# Patient Record
Sex: MALE | Race: WHITE | Employment: UNEMPLOYED | URBAN - METROPOLITAN AREA
[De-identification: names, ages, dates, MRNs, and addresses within clinical notes are randomized per-mention and may not be internally consistent; named-entity substitution may affect disease eponyms.]

---

## 2020-10-22 ENCOUNTER — OFFICE VISIT (OUTPATIENT)
Dept: URGENT CARE | Facility: URGENT CARE | Age: 25
End: 2020-10-22
Payer: COMMERCIAL

## 2020-10-22 VITALS
WEIGHT: 158 LBS | HEART RATE: 86 BPM | DIASTOLIC BLOOD PRESSURE: 71 MMHG | RESPIRATION RATE: 16 BRPM | OXYGEN SATURATION: 99 % | SYSTOLIC BLOOD PRESSURE: 122 MMHG

## 2020-10-22 DIAGNOSIS — H66.001 NON-RECURRENT ACUTE SUPPURATIVE OTITIS MEDIA OF RIGHT EAR WITHOUT SPONTANEOUS RUPTURE OF TYMPANIC MEMBRANE: ICD-10-CM

## 2020-10-22 DIAGNOSIS — J02.9 SORE THROAT: Primary | ICD-10-CM

## 2020-10-22 LAB
DEPRECATED S PYO AG THROAT QL EIA: NEGATIVE
SPECIMEN SOURCE: NORMAL
SPECIMEN SOURCE: NORMAL
STREP GROUP A PCR: NOT DETECTED

## 2020-10-22 PROCEDURE — 99204 OFFICE O/P NEW MOD 45 MIN: CPT | Performed by: NURSE PRACTITIONER

## 2020-10-22 PROCEDURE — 99N1174 PR STATISTIC STREP A RAPID: Performed by: NURSE PRACTITIONER

## 2020-10-22 PROCEDURE — 87651 STREP A DNA AMP PROBE: CPT | Performed by: NURSE PRACTITIONER

## 2020-10-22 RX ORDER — AMOXICILLIN 500 MG/1
1000 CAPSULE ORAL 2 TIMES DAILY
Qty: 40 CAPSULE | Refills: 0 | Status: SHIPPED | OUTPATIENT
Start: 2020-10-22 | End: 2020-11-01

## 2020-10-22 RX ORDER — LIDOCAINE HYDROCHLORIDE 20 MG/ML
15 SOLUTION OROPHARYNGEAL
Qty: 200 ML | Refills: 0 | Status: SHIPPED | OUTPATIENT
Start: 2020-10-22 | End: 2020-10-29

## 2020-10-22 NOTE — PATIENT INSTRUCTIONS
Patient Education     Otitis Media (Infección del Oído)   Qué es la otitis media?     Click Image to Enlarge   La otitis media es diego infección o inflamación localizada en el oído medio.  Alrededor del 75 por ciento de los niños covington padecido al menos un episodio de otitis media para la edad de shakeel años. Otitis media puede también afecta a los adultos, aunque es principalmente diego enfermedad que se presenta con mayor frecuencia en los niños.    Qué causa la otitis media?  La inflamación normalmente empieza cuando las infecciones debidas a inflamación de la garganta, resfriados u otros problemas respiratorios, se extienden al oído medio.   Cuáles son los síntomas de la otitis media?  A continuación, se enumeran los síntomas más comunes de la otitis media. Sin embargo, cada individuo puede experimentar los síntomas de diego forma diferente.  Los síntomas comunes de otitis media en los niños incluyen los siguientes:    Irritabilidad inusual.    Dificultad para dormir o permanecer dormido.    Tirón de diego o ambas orejas.    Fiebre, especialmente en los bebes y los niños pequeños.    Drenaje de líquido del oído, u oídos.    Pérdida del equilibrio.    Dificultades con la audición.  Los síntomas de la otitis media pueden parecerse a los de otras condiciones o problemas médicos. Consulte a un médico para feliciano diagnóstico.   Cuáles son los efectos de la otitis media?  Además de los síntomas enumerados anteriormente para la otitis media, la otitis media no tratada puede producir cualquiera o todos los problemas siguientes:    Infección en otras partes de la shauna.    Pérdida permanente de la audición.    Problemas con el habla y el desarrollo del lenguaje.   Puede prevenirse la otitis media?  Los medicamentos para el resfriado y la alergia no parecen prevenir la otitis media. Actualmente, no existe diego vacuna que pueda prevenir la enfermedad. Sin embargo, existen ciertos factores que parecen elevar las probabilidades de que  algunos niños puedan desarrollar la otitis media, entre los que se incluyen los siguientes:    Vivir en diego casa donde se fumen cigarrillos.    Brown el biberón mientras está acostado.   Cómo se diagnostica la otitis media?  Además de un examen físico e historia médica completa, el médico examinará el oído externo y el tímpano, en gilberto o ambos oídos, utilizando un otoscopio. El otoscopio es un instrumento que lleva diego anyi que permite al médico darin dentro del oído. Un otoscopio neumático echa un soplo de aire en el oído para analizar el movimiento del tímpano.  Asimismo, puede realizarse diego timpanometría, un examen que permite dirigir el aire y el grey al oído medio.  Un examen de la audición puede realizarse a las personas que tienen infecciones del oído frecuentes.  Tratamiento para la otitis media  El tratamiento específico será determinado por el médico, o médicos basándose en lo siguiente:    La edad del paciente, feliciano estado general de loi y feliciano historia médica.    Que coughlin avanzada está la enfermedad.    Hillary expectativas para la trayectoria de la enfermedad.    La tolerancia del paciente a determinados medicamentos, procedimientos o terapias.    La opinión o preferencia del paciente (o de feliciano taylor).  El tratamiento puede incluir lo siguiente:    Medicamentos antibióticos.    Medicamentos para el dolor.    Cirugía en la que se introducen tubos con el fin de tratar infecciones crónicas del tímpano.    [id number] [employer]. [address] [place] [id number]. Todos los derechos reservados. Esta información no pretende sustituir la atención médica profesional. Sólo feliciano médico puede diagnosticar y tratar un problema de loi.           Patient Education     Cuando usted tiene dolor de garganta    El dolor de garganta puede ser muy molesto y puede tener muchas causas diferentes. Feliciano proveedor de atención médica colaborará con usted para determinar la causa de feliciano dolor de garganta y encontrar  el tratamiento más adecuado para feliciano adrian.   Cuáles son las causas del dolor de garganta?  Las siguientes cosas pueden causar o empeorar el dolor de garganta:    Los virus del catarro y de la gripe    Las bacterias    Los irritantes ivy el humo de tabaco o la polución en el aire    El reflujo ácido  Twyla garganta ailyn  Las amígdalas se encuentran a ambos lados de la garganta, cerca de la base de la lengua. En ellas se acumulan los virus y las bacterias, y ayudan a combatir las infecciones. La garganta (faringe) es la vía por la que pasa el aire. La mucosidad de la cavidad nasal también baja por maylin conducto.  Twyla garganta inflamada  Las amígdalas y la faringe pueden inflamarse a consecuencia de los virus del catarro y de la gripe. El goteo postnasal (exceso de mucosidad procedente de la cavidad nasal) puede irritar la garganta y hacer que esta o las amígdalas se vuelvan más propensas a las infecciones por bacterias. La tonsilitis severa en los niños y en los adultos, si se meggan sin tratar, puede causar la formación de bolsas de pus (abscesos) cerca de las amígdalas.  Feliciano evaluación  Un examen médico puede ser útil para determinar la causa de feliciano dolor de garganta y también puede ayudar a feliciano proveedor de atención médica a elegir el tratamiento más adecuado para usted. La evaluación podría incluir twyla historia clínica, un examen médico y pruebas de diagnóstico.  Historia clínica  Feliciano proveedor de atención médica podría preguntarle lo siguiente:     Cuánto tiempo hace que tiene dolor de garganta y qué tratamiento le covington dado hasta ahora?     Tiene algún otro síntoma, ivy yissel en el cuerpo, fiebre o tos?     Es recurrente (aparece periódicamente) feliciano dolor de garganta? En neftali adrian  con qué frecuencia lo tiene?  Cuántos días de escuela o trabajo paulson perdido a consecuencia del dolor de garganta?     Tiene dificultad para comer o para tragar?     Le covington dicho alguna vez que ronca, o tiene usted otros trastornos del  sueño?     Tiene mal aliento?    Al toser,  expulsa mucosidad con sabor desagradable?  Examen médico  Sabino maylin examen, feliciano proveedor de atención médica le examinará los oídos, la nariz y la garganta para determinar si hay algún problema. También revisará si tiene hinchazón en el rafiq y le auscultará el pecho.  Pruebas posibles  Feliciano proveedor de atención médica también podría hacerle las siguientes pruebas:    Diego muestra o exudado faríngeo para revisar si tiene bacterias tales ivy los estreptococos (bacterias que causan amigdalitis estreptocócica)    Un análisis de rohit para revisar si tiene mononucleosis (diego infección por virus)    Diego radiografía para revisar si tiene neumonía, especialmente si tiene tos  Tratamiento del dolor de garganta  El tratamiento depende de muchos factores.  Cuál es la causa más probable?  Se trata de un problema reciente o es algo que desaparece y aparece de nuevo? En muchos casos, lo mejor es tratar los síntomas, descansar y dejar que el problema se resuelva por sí mismo. Los antibióticos pueden ayudar a eliminar algunas infecciones bacterianas. Para los casos más graves de amigdalitis recurrente, es posible que deban sacarle las amígdalas.  Cómo aliviar los síntomas    No fume y evite el humo de segunda mano.    Para los niños, deles un spray para la garganta o helados de jugo (paletas heladas). Los adultos y los niños mayores pueden usar pastillas para la garganta.    Dasia líquidos calientes para aliviar la garganta y ayudar a diluir la mucosidad. Evite las bebidas alcohólicas, las comidas picantes y las bebidas ácidas ivy el jugo de naranja, ya que pueden irritar más la garganta.    [place] gárgaras con agua tibia salada (1 cucharadita de sal disuelta en 8 onzas de agua tibia).    Use un humidificador para mantener la humedad en el aire y aliviar el resecamiento de la garganta.    [place] medicamentos contra el dolor disponibles sin receta, ivy el acetaminofén y el ibuprofeno. Use  estos medicamentos siguiendo las indicaciones y sin exceder la dosis recomendada. No le dé aspirina a los niños.    Se necesitan antibióticos?  Si la causa de feliciano dolor de garganta es diego infección bacteriana, los antibióticos pueden ayudarle a sanar más rápidamente y prevenir las complicaciones. Aunque la infección de garganta por estreptecocos es diego nfección importante que es tratable, esta constituye tan solo entre el 5 al 15% de los yissel de garganta en los adultos que buscan atención médica. La mayoría de los casos de dolor de garganta son causados por los virus del resfriado o de la gripe, y los antibióticos no son útiles para las infecciones virales. De hecho, el uso de antibióticos cuando no son necesarios puede producir bacterias que son más difíciles de destruir. Feliciano proveedor de atención médica le recetará antibióticos solamente si considera que estos medicamentos podrían ayudarlo.  Si le recetan antibióticos  [place] los medicamentos exactamente según las indicaciones. Asegúrese de terminar toda la cantidad que le hayan indicado, aun cuando se sienta mejor. Asimismo, consulte con feliciano proveedor de atención médica o con feliciano farmacéutico para averiguar cuáles son los efectos secundarios comunes y lo que debe hacer si se le presentan.   Necesita cirugía?  En algunos casos puede ser necesario extraer las amígdalas. [place] suele hacerse ivy paciente ambulatorio o externo (el paciente regresa a casa el mismo día de la operación). Feliciano proveedor de atención médica podría aconsejarle la extracción de las amígdalas en los siguientes casos:    Ha tenido varios episodios graves de amigdalitis en el transcurso de un año. Se consideran episodios  graves  aquellos que le hacen perder días de escuela o de trabajo, o que deben tratarse con antibióticos.    Amigdalitis que causa problemas respiratorios cori el sueño.    Amigdalitis causada por partículas de comida que se acumulan en las bolsas o criptas de las amígdalas  (amigdalitis críptica).  Llame a feliciano proveedor de atención médica en cualquiera de los siguientes casos:    Le empeoran los síntomas o le aparecen síntomas nuevos.    Inflamación de las amígdalas que dificulta la respiración.    El dolor es tan andrew que le impide beber líquidos.    Tiene diego erupción en la piel, urticaria o sibilancias al respirar; cualquiera de estas señales podría indicar diego reacción alérgica a los antibióticos.    Hillary síntomas no mejoran en el transcurso de diego semana.    Hillary síntomas no mejoran en el transcurso de 2 o 3 días desde que empieza a damaso antibióticos.   Date Last Reviewed: 10/1/2016    [id number] [employer]. [address] [place] [address], [place] [address]. Todos los derechos reservados. Esta información no pretende sustituir la atención médica profesional. Sólo feliciano médico puede diagnosticar y tratar un problema de loi.

## 2020-10-22 NOTE — PROGRESS NOTES
SUBJECTIVE:   Chaim Hallman is a 25 year old male presenting with a chief complaint of ST, pain with swallowing and lump on the right side of neck.    Onset of symptoms was 1 day(s) ago.  Course of illness is same.    Severity moderate  Previous Treatment Ibuprofen      History reviewed. No pertinent past medical history.  Current Outpatient Medications   Medication Sig Dispense Refill     amoxicillin (AMOXIL) 500 MG capsule Take 2 capsules (1,000 mg) by mouth 2 times daily for 10 days 40 capsule 0     lidocaine (XYLOCAINE) 2 % solution Swish and spit 15 mLs in mouth every 3 hours as needed for moderate pain ; Max 8 doses/24 hour period. 200 mL 0     Social History     Tobacco Use     Smoking status: Current Every Day Smoker     Packs/day: 0.25     Years: 5.00     Pack years: 1.25     Types: Cigarettes     Smokeless tobacco: Never Used   Substance Use Topics     Alcohol use: Not on file     History reviewed. No pertinent family history.     ROS: 10 point ROS neg other than the symptoms noted above in the HPI.     OBJECTIVE  /71 (BP Location: Left arm, Patient Position: Chair, Cuff Size: Adult Large)   Pulse 86   Resp 16   Wt 71.7 kg (158 lb)   SpO2 99%       GENERAL APPEARANCE: healthy appearing, alert     EYES: PERRL, EOMI, sclera non-icteric     HENT: oral exam benign, mucus membranes intact, without ulcers or lesions     HENT: nose and mouth without ulcers or lesions, TM congested/bulging right, tonsillar hypertrophy, tonsillar erythema and tonsillar exudate     NECK: no adenopathy or asymmetry, thyroid normal to palpation     RESP: lungs clear to auscultation - no rales, rhonchi or wheezes     CV: regular rates and rhythm, no murmurs, rubs, or gallop     SKIN: no suspicious lesions or rashes      Labs:  Results for orders placed or performed in visit on 10/22/20 (from the past 24 hour(s))   Streptococcus A Rapid Scr w Reflx to PCR    Specimen: Throat   Result Value Ref Range    Strep Specimen Description  Throat     Streptococcus Group A Rapid Screen Negative NEG^Negative         ASSESSMENT/PLAN:      ICD-10-CM    1. Sore throat  J02.9 Streptococcus A Rapid Scr w Reflx to PCR     lidocaine (XYLOCAINE) 2 % solution     Group A Streptococcus PCR Throat Swab   2. Non-recurrent acute suppurative otitis media of right ear without spontaneous rupture of tympanic membrane  H66.001 amoxicillin (AMOXIL) 500 MG capsule        Follow Up:      Patient Instructions       Patient Education     Otitis Media (Infección del Oído)   Qué es la otitis media?     Click Image to Enlarge   La otitis media es diego infección o inflamación localizada en el oído medio.  Alrededor del 75 por ciento de los niños covington padecido al menos un episodio de otitis media para la edad de shakeel años. Otitis media puede también afecta a los adultos, aunque es principalmente diego enfermedad que se presenta con mayor frecuencia en los niños.    Qué causa la otitis media?  La inflamación normalmente empieza cuando las infecciones debidas a inflamación de la garganta, resfriados u otros problemas respiratorios, se extienden al oído medio.   Cuáles son los síntomas de la otitis media?  A continuación, se enumeran los síntomas más comunes de la otitis media. Sin embargo, cada individuo puede experimentar los síntomas de diego forma diferente.  Los síntomas comunes de otitis media en los niños incluyen los siguientes:    Irritabilidad inusual.    Dificultad para dormir o permanecer dormido.    Tirón de diego o ambas orejas.    Fiebre, especialmente en los bebes y los niños pequeños.    Drenaje de líquido del oído, u oídos.    Pérdida del equilibrio.    Dificultades con la audición.  Los síntomas de la otitis media pueden parecerse a los de otras condiciones o problemas médicos. Consulte a un médico para feliciano diagnóstico.   Cuáles son los efectos de la otitis media?  Además de los síntomas enumerados anteriormente para la otitis media, la otitis media no tratada puede  producir cualquiera o todos los problemas siguientes:    Infección en otras partes de la shauna.    Pérdida permanente de la audición.    Problemas con el habla y el desarrollo del lenguaje.   Puede prevenirse la otitis media?  Los medicamentos para el resfriado y la alergia no parecen prevenir la otitis media. Actualmente, no existe diego vacuna que pueda prevenir la enfermedad. Sin embargo, existen ciertos factores que parecen elevar las probabilidades de que algunos niños puedan desarrollar la otitis media, entre los que se incluyen los siguientes:    Vivir en diego casa donde se fumen cigarrillos.    Brown el biberón mientras está acostado.   Cómo se diagnostica la otitis media?  Además de un examen físico e historia médica completa, el médico examinará el oído externo y el tímpano, en gilberto o ambos oídos, utilizando un otoscopio. El otoscopio es un instrumento que lleva diego anyi que permite al médico darin dentro del oído. Un otoscopio neumático echa un soplo de aire en el oído para analizar el movimiento del tímpano.  Asimismo, puede realizarse diego timpanometría, un examen que permite dirigir el aire y el grey al oído medio.  Un examen de la audición puede realizarse a las personas que tienen infecciones del oído frecuentes.  Tratamiento para la otitis media  El tratamiento específico será determinado por el médico, o médicos basándose en lo siguiente:    La edad del paciente, feliciano estado general de loi y feliciano historia médica.    Que coughlin avanzada está la enfermedad.    Hillary expectativas para la trayectoria de la enfermedad.    La tolerancia del paciente a determinados medicamentos, procedimientos o terapias.    La opinión o preferencia del paciente (o de feliciano taylor).  El tratamiento puede incluir lo siguiente:    Medicamentos antibióticos.    Medicamentos para el dolor.    Cirugía en la que se introducen tubos con el fin de tratar infecciones crónicas del tímpano.    [id number] [employer]. [address] [place] [address]  [facility], [place] [address]. Todos los derechos reservados. Esta información no pretende sustituir la atención médica profesional. Sólo feliciano médico puede diagnosticar y tratar un problema de loi.           Patient Education     Cuando usted tiene dolor de garganta    El dolor de garganta puede ser muy molesto y puede tener muchas causas diferentes. Feliciano proveedor de atención médica colaborará con usted para determinar la causa de feliciano dolor de garganta y encontrar el tratamiento más adecuado para feliciano adrian.   Cuáles son las causas del dolor de garganta?  Las siguientes cosas pueden causar o empeorar el dolor de garganta:    Los virus del catarro y de la gripe    Las bacterias    Los irritantes ivy el humo de tabaco o la polución en el aire    El reflujo ácido  Twyla garganta ailyn  Las amígdalas se encuentran a ambos lados de la garganta, cerca de la base de la lengua. En ellas se acumulan los virus y las bacterias, y ayudan a combatir las infecciones. La garganta (faringe) es la vía por la que pasa el aire. La mucosidad de la cavidad nasal también baja por maylin conducto.  Twyla garganta inflamada  Las amígdalas y la faringe pueden inflamarse a consecuencia de los virus del catarro y de la gripe. El goteo postnasal (exceso de mucosidad procedente de la cavidad nasal) puede irritar la garganta y hacer que esta o las amígdalas se vuelvan más propensas a las infecciones por bacterias. La tonsilitis severa en los niños y en los adultos, si se meggan sin tratar, puede causar la formación de bolsas de pus (abscesos) cerca de las amígdalas.  Feliciano evaluación  Un examen médico puede ser útil para determinar la causa de feliciano dolor de garganta y también puede ayudar a feliciano proveedor de atención médica a elegir el tratamiento más adecuado para usted. La evaluación podría incluir twyla historia clínica, un examen médico y pruebas de diagnóstico.  Historia clínica  Feliciano proveedor de atención médica podría preguntarle lo siguiente:     Cuánto tiempo  hace que tiene dolor de garganta y qué tratamiento le covington dado hasta ahora?     Tiene algún otro síntoma, ivy yissel en el cuerpo, fiebre o tos?     Es recurrente (aparece periódicamente) feliciano dolor de garganta? En neftali adrian  con qué frecuencia lo tiene?  Cuántos días de escuela o trabajo paulson perdido a consecuencia del dolor de garganta?     Tiene dificultad para comer o para tragar?     Le covington dicho alguna vez que ronca, o tiene usted otros trastornos del sueño?     Tiene mal aliento?    Al toser,  expulsa mucosidad con sabor desagradable?  Examen médico  Sabino maylin examen, feliciano proveedor de atención médica le examinará los oídos, la nariz y la garganta para determinar si hay algún problema. También revisará si tiene hinchazón en el rafiq y le auscultará el pecho.  Pruebas posibles  Feliciano proveedor de atención médica también podría hacerle las siguientes pruebas:    Twyla muestra o exudado faríngeo para revisar si tiene bacterias tales ivy los estreptococos (bacterias que causan amigdalitis estreptocócica)    Un análisis de rohit para revisar si tiene mononucleosis (twyla infección por virus)    Twyla radiografía para revisar si tiene neumonía, especialmente si tiene tos  Tratamiento del dolor de garganta  El tratamiento depende de muchos factores.  Cuál es la causa más probable?  Se trata de un problema reciente o es algo que desaparece y aparece de nuevo? En muchos casos, lo mejor es tratar los síntomas, descansar y dejar que el problema se resuelva por sí mismo. Los antibióticos pueden ayudar a eliminar algunas infecciones bacterianas. Para los casos más graves de amigdalitis recurrente, es posible que deban sacarle las amígdalas.  Cómo aliviar los síntomas    No fume y evite el humo de segunda mano.    Para los niños, deles un spray para la garganta o helados de jugo (paletas heladas). Los adultos y los niños mayores pueden usar pastillas para la garganta.    Dasia líquidos calientes para aliviar la garganta y ayudar a  diluir la mucosidad. Evite las bebidas alcohólicas, las comidas picantes y las bebidas ácidas ivy el jugo de naranja, ya que pueden irritar más la garganta.    [place] gárgaras con agua tibia salada (1 cucharadita de sal disuelta en 8 onzas de agua tibia).    Use un humidificador para mantener la humedad en el aire y aliviar el resecamiento de la garganta.    [place] medicamentos contra el dolor disponibles sin receta, ivy el acetaminofén y el ibuprofeno. Use estos medicamentos siguiendo las indicaciones y sin exceder la dosis recomendada. No le dé aspirina a los niños.    Se necesitan antibióticos?  Si la causa de feliciano dolor de garganta es diego infección bacteriana, los antibióticos pueden ayudarle a sanar más rápidamente y prevenir las complicaciones. Aunque la infección de garganta por estreptecocos es diego nfección importante que es tratable, esta constituye tan solo entre el 5 al 15% de los yissel de garganta en los adultos que buscan atención médica. La mayoría de los casos de dolor de garganta son causados por los virus del resfriado o de la gripe, y los antibióticos no son útiles para las infecciones virales. De hecho, el uso de antibióticos cuando no son necesarios puede producir bacterias que son más difíciles de destruir. Feliciano proveedor de atención médica le recetará antibióticos solamente si considera que estos medicamentos podrían ayudarlo.  Si le recetan antibióticos  [place] los medicamentos exactamente según las indicaciones. Asegúrese de terminar toda la cantidad que le hayan indicado, aun cuando se sienta mejor. Asimismo, consulte con feliciano proveedor de atención médica o con feliciano farmacéutico para averiguar cuáles son los efectos secundarios comunes y lo que debe hacer si se le presentan.   Necesita cirugía?  En algunos casos puede ser necesario extraer las amígdalas. [place] suele hacerse ivy paciente ambulatorio o externo (el paciente regresa a casa el mismo día de la operación). Feliciano proveedor de atención médica  podría aconsejarle la extracción de las amígdalas en los siguientes casos:    Ha tenido varios episodios graves de amigdalitis en el transcurso de un año. Se consideran episodios  graves  aquellos que le hacen perder días de escuela o de trabajo, o que deben tratarse con antibióticos.    Amigdalitis que causa problemas respiratorios cori el sueño.    Amigdalitis causada por partículas de comida que se acumulan en las bolsas o criptas de las amígdalas (amigdalitis críptica).  Llame a feliciano proveedor de atención médica en cualquiera de los siguientes casos:    Le empeoran los síntomas o le aparecen síntomas nuevos.    Inflamación de las amígdalas que dificulta la respiración.    El dolor es tan andrew que le impide beber líquidos.    Tiene diego erupción en la piel, urticaria o sibilancias al respirar; cualquiera de estas señales podría indicar diego reacción alérgica a los antibióticos.    Hillary síntomas no mejoran en el transcurso de diego semana.    Hillary síntomas no mejoran en el transcurso de 2 o 3 días desde que empieza a damaso antibióticos.   Date Last Reviewed: 10/1/2016    [id number] [employer]. [address] [place] [id number]. Todos los derechos reservados. Esta información no pretende sustituir la atención médica profesional. Sólo feliciano médico puede diagnosticar y tratar un problema de loi.               Meghana HERLINDA Ferrari, CNP  [facility] Provider

## 2020-12-25 ENCOUNTER — HOSPITAL ENCOUNTER (EMERGENCY)
Facility: CLINIC | Age: 25
Discharge: HOME | End: 2020-12-26
Attending: EMERGENCY MEDICINE | Admitting: EMERGENCY MEDICINE
Payer: COMMERCIAL

## 2020-12-25 DIAGNOSIS — R00.2 PALPITATIONS: ICD-10-CM

## 2020-12-25 DIAGNOSIS — F41.0 ANXIETY ATTACK: ICD-10-CM

## 2020-12-25 DIAGNOSIS — F10.920 ALCOHOLIC INTOXICATION WITHOUT COMPLICATION (H): ICD-10-CM

## 2020-12-25 DIAGNOSIS — R06.00 DYSPNEA, UNSPECIFIED TYPE: ICD-10-CM

## 2020-12-25 LAB
ANION GAP SERPL CALCULATED.3IONS-SCNC: 6 MMOL/L (ref 3–14)
BASOPHILS # BLD AUTO: 0.1 10E9/L (ref 0–0.2)
BASOPHILS NFR BLD AUTO: 0.7 %
BUN SERPL-MCNC: 9 MG/DL (ref 7–30)
CALCIUM SERPL-MCNC: 8.3 MG/DL (ref 8.5–10.1)
CHLORIDE SERPL-SCNC: 106 MMOL/L (ref 94–109)
CO2 SERPL-SCNC: 28 MMOL/L (ref 20–32)
CREAT SERPL-MCNC: 1.09 MG/DL (ref 0.66–1.25)
DIFFERENTIAL METHOD BLD: NORMAL
EOSINOPHIL # BLD AUTO: 0.2 10E9/L (ref 0–0.7)
EOSINOPHIL NFR BLD AUTO: 2.6 %
ERYTHROCYTE [DISTWIDTH] IN BLOOD BY AUTOMATED COUNT: 12.4 % (ref 10–15)
GFR SERPL CREATININE-BSD FRML MDRD: >90 ML/MIN/{1.73_M2}
GLUCOSE SERPL-MCNC: 114 MG/DL (ref 70–99)
HCT VFR BLD AUTO: 44.2 % (ref 40–53)
HGB BLD-MCNC: 15 G/DL (ref 13.3–17.7)
IMM GRANULOCYTES # BLD: 0 10E9/L (ref 0–0.4)
IMM GRANULOCYTES NFR BLD: 0.4 %
INTERPRETATION ECG - MUSE: NORMAL
LYMPHOCYTES # BLD AUTO: 2.9 10E9/L (ref 0.8–5.3)
LYMPHOCYTES NFR BLD AUTO: 41.8 %
MCH RBC QN AUTO: 31.7 PG (ref 26.5–33)
MCHC RBC AUTO-ENTMCNC: 33.9 G/DL (ref 31.5–36.5)
MCV RBC AUTO: 93 FL (ref 78–100)
MONOCYTES # BLD AUTO: 0.6 10E9/L (ref 0–1.3)
MONOCYTES NFR BLD AUTO: 8.5 %
NEUTROPHILS # BLD AUTO: 3.1 10E9/L (ref 1.6–8.3)
NEUTROPHILS NFR BLD AUTO: 46 %
NRBC # BLD AUTO: 0 10*3/UL
NRBC BLD AUTO-RTO: 0 /100
PLATELET # BLD AUTO: 256 10E9/L (ref 150–450)
POTASSIUM SERPL-SCNC: 3.8 MMOL/L (ref 3.4–5.3)
RBC # BLD AUTO: 4.73 10E12/L (ref 4.4–5.9)
SODIUM SERPL-SCNC: 140 MMOL/L (ref 133–144)
WBC # BLD AUTO: 6.8 10E9/L (ref 4–11)

## 2020-12-25 PROCEDURE — 96361 HYDRATE IV INFUSION ADD-ON: CPT

## 2020-12-25 PROCEDURE — 85025 COMPLETE CBC W/AUTO DIFF WBC: CPT | Performed by: EMERGENCY MEDICINE

## 2020-12-25 PROCEDURE — 93005 ELECTROCARDIOGRAM TRACING: CPT

## 2020-12-25 PROCEDURE — 96360 HYDRATION IV INFUSION INIT: CPT

## 2020-12-25 PROCEDURE — C9803 HOPD COVID-19 SPEC COLLECT: HCPCS

## 2020-12-25 PROCEDURE — 99285 EMERGENCY DEPT VISIT HI MDM: CPT | Mod: 25

## 2020-12-25 PROCEDURE — 80320 DRUG SCREEN QUANTALCOHOLS: CPT | Performed by: EMERGENCY MEDICINE

## 2020-12-25 PROCEDURE — 258N000003 HC RX IP 258 OP 636: Performed by: EMERGENCY MEDICINE

## 2020-12-25 PROCEDURE — 80048 BASIC METABOLIC PNL TOTAL CA: CPT | Performed by: EMERGENCY MEDICINE

## 2020-12-25 RX ORDER — ACETAMINOPHEN 500 MG
1000 TABLET ORAL ONCE
Status: COMPLETED | OUTPATIENT
Start: 2020-12-26 | End: 2020-12-26

## 2020-12-25 RX ADMIN — SODIUM CHLORIDE 1000 ML: 9 INJECTION, SOLUTION INTRAVENOUS at 23:59

## 2020-12-25 NOTE — ED AVS SNAPSHOT
[facility] Emergency Dept  [address] [address]  Phone: [phone]  Fax: [phone]                                    Chaim Hallman   MRN: [id number]    Department: [facility] Emergency Dept   Date of Visit: 12/25/2020           After Visit Summary Signature Page    I have received my discharge instructions, and my questions have been answered. I have discussed any challenges I see with this plan with the nurse or doctor.    ..........................................................................................................................................  Patient/Patient Representative Signature      ..........................................................................................................................................  Patient Representative Print Name and Relationship to Patient    ..................................................               ................................................  Date                                   Time    ..........................................................................................................................................  Reviewed by Signature/Title    ...................................................              ..............................................  Date                                               Time          22EPIC Rev 08/18

## 2020-12-26 ENCOUNTER — APPOINTMENT (OUTPATIENT)
Dept: GENERAL RADIOLOGY | Facility: CLINIC | Age: 25
End: 2020-12-26
Attending: EMERGENCY MEDICINE
Payer: COMMERCIAL

## 2020-12-26 VITALS
OXYGEN SATURATION: 98 % | HEART RATE: 100 BPM | DIASTOLIC BLOOD PRESSURE: 72 MMHG | SYSTOLIC BLOOD PRESSURE: 124 MMHG | TEMPERATURE: 99.2 F | RESPIRATION RATE: 18 BRPM

## 2020-12-26 LAB
ETHANOL SERPL-MCNC: 0.2 G/DL
FLUABV+SARS-COV-2+RSV PNL RESP NAA+PROBE: NEGATIVE
FLUABV+SARS-COV-2+RSV PNL RESP NAA+PROBE: NEGATIVE
LABORATORY COMMENT REPORT: NORMAL
RSV RNA SPEC QL NAA+PROBE: NORMAL
SARS-COV-2 RNA SPEC QL NAA+PROBE: NEGATIVE
SPECIMEN SOURCE: NORMAL

## 2020-12-26 PROCEDURE — 87636 SARSCOV2 & INF A&B AMP PRB: CPT | Performed by: EMERGENCY MEDICINE

## 2020-12-26 PROCEDURE — 71045 X-RAY EXAM CHEST 1 VIEW: CPT

## 2020-12-26 PROCEDURE — 250N000013 HC RX MED GY IP 250 OP 250 PS 637: Performed by: EMERGENCY MEDICINE

## 2020-12-26 RX ADMIN — ACETAMINOPHEN 1000 MG: 500 TABLET, FILM COATED ORAL at 00:22

## 2020-12-26 ASSESSMENT — ENCOUNTER SYMPTOMS
MYALGIAS: 0
NERVOUS/ANXIOUS: 1
HEADACHES: 0
CHEST TIGHTNESS: 1
COUGH: 0
SHORTNESS OF BREATH: 1
PALPITATIONS: 1
FEVER: 0

## 2020-12-26 NOTE — ED PROVIDER NOTES
History   Chief Complaint:  Shortness of breath and palpitations    The history is provided by the patient.      Chaim Hallman is a 25 year old male who presents with shortness of breath and palpitations. The patient states that he had some beer and tequila today with his family before going to sleep. An hour after going to bed, he woke up and felt as if his heart was racing and that he could not catch his breath. In addition, he reports having facial numbness. He states that his chest doesn't hurt but that there is a tightness sensation and palpitations. He notes that he drinks some days but has never had this type of reaction to alcohol. He does admit to drinking enough tonight to be drunk. Currently he feels tired. He denies headache, body aches, fever, cough, and any known COVID exposure.  There was no syncope, and no pleuritic chest pain.    Review of Systems   Constitutional: Negative for fever.   Respiratory: Positive for chest tightness and shortness of breath. Negative for cough.    Cardiovascular: Positive for palpitations.   Musculoskeletal: Negative for myalgias.   Neurological: Negative for headaches.   Psychiatric/Behavioral: The patient is nervous/anxious.    All other systems reviewed and are negative.      Allergies:  The patient has no known allergies.     Medications:  The patient is not currently taking any prescribed medications.    Past Medical History:    Condyloma  Scrotal mass    Past Surgical History:    Scrotal exploration    Social History:  The patient presents by himself    Physical Exam     Patient Vitals for the past 24 hrs:   BP Temp Temp src Pulse Resp SpO2   12/26/20 0140 124/72 -- -- 100 -- 98 %   12/25/20 2353 -- -- -- -- 18 --   12/25/20 2314 130/78 99.2  F (37.3  C) Oral 104 -- 98 %       Physical Exam  Constitutional:  Cooperative. Looks fatigued.  HENT:   Head:    Atraumatic.   Mouth/Throat:   Oropharynx is without erythema or exudate. Tacky oral mucosa.  Eyes:     Conjunctivae normal and EOM are normal.      Pupils are equal, round, and reactive to light.   Neck:    Normal range of motion. Neck supple.   Cardiovascular:  Normal rate, regular rhythm, normal heart sounds and radial and dorsalis pedis pulses are 2+ and symmetric.    Pulmonary/Chest:  Effort normal and breath sounds normal.   Abdominal:   Soft. Bowel sounds are normal.      No splenomegaly or hepatomegaly. No tenderness. No rebound.   Musculoskeletal:  Normal range of motion. No edema and no tenderness.   Neurological:  Alert. Normal strength. No cranial nerve deficit. GCS 15  Skin:    Skin is warm and dry.   Psychiatric:   Normal mood and affect.     Emergency Department Course   ECG (23:30:58):  Rate 94 bpm. AK interval 136. QRS duration 84. QT/QTc 346/432. P-R-T axes 61 73 46. Sinus rhythm. Normal EKG. Interpreted at 2335 by Perez Cruz MD.    Imaging:    X-ray Chest Port, 1 view:  No evidence of active cardiopulmonary disease.   Result per radiology.     Laboratory:    CBC: WBC: 6.8, HGB: 15.0, PLT: 256  BMP: Glucose 114 (H), Calcium: 8.3 (L), o/w WNL (Creatinine: 1.09)  Alcohol Ethyl: 0.20 (H)  Symptomatic Influenza A/B & SARS-CoV2 (COVID-19) Virus by PCR: Negative    Emergency Department Course:    Reviewed:    I reviewed the patient's nursing notes, vitals, past medical records, Care Everywhere.     Assessments:    2324: I performed an exam of the patient and obtained history, as documented above.    Blood drawn. This was sent to the lab for further testing, results above.  EKG performed, results above.  The patient's nose was swabbed and this sample was sent for COVID testing, findings above.   The patient was sent for a Chest X-ray while in the emergency department, findings above.    0116: I rechecked the patient and updated them on findings. They are amenable to discharge.     Interventions:  2359: NS 1L IV Bolus   0022: Tylenol 1000 mg PO    Disposition:  The patient was discharged to home.      Impression & Plan   Medical Decision Making:  Patient presents complaining of sudden onset of racing heart and shortness of breath which woke him from sleep. His EKG looks normal. He notes drinking lots of alcohol tonight. Clinically, he looked mildly dehydrated. He was given 1 L of IV fluids. Laboratory testing did not detect electrolyte abnormalities. CBC and differential look unremarkable. He remained in sinus rhythm on monitor. Chest x-ray did not show evidence for diffuse infiltrate or other acute process.    Because the patient had complaints of dyspnea and fatigue, I checked him for COVID 19 this was also negative.    Patient was observed in the ED for a couple of hours. He reported his shortness of breath symptoms have resolved. Patient notes that he has had past anxiety attacks, and tonight's episode was likely consistent with that, but maybe worsened by his intoxication and likely dehydration.    Patient was given general discharge instructions regarding palpitations, stress reaction,  and dyspnea. He will follow up with PCP next week for any persistence of symptoms. Return to ED for worsening symptoms or other concerns.     We discussed his test results and reasons to return and he voiced understanding. We used a  to facilitate conversation.    Diagnosis:    ICD-10-CM    1. Alcoholic intoxication without complication (H)  F10.920    2. Palpitations  R00.2    3. Dyspnea, unspecified type  R06.00    4. Anxiety attack  F41.0      Scribe Disclosure:  I, Severino Rodriguez, am serving as a scribe at 11:35 PM on 12/25/2020 to document services personally performed by Perez Cruz MD based on my observations and the provider's statements to me.          Perez Cruz MD  12/26/20 0119

## 2020-12-26 NOTE — ED TRIAGE NOTES
Patient called EMS after waking from sleep with sudden onset of shortness of breath that woke him out of his sleep. Patient admits to drinking some alcohol tonight prior to going to sleep. Patient speaks limited english

## 2021-05-08 ENCOUNTER — HOSPITAL ENCOUNTER (EMERGENCY)
Facility: CLINIC | Age: 26
Discharge: HOME | End: 2021-05-08
Attending: EMERGENCY MEDICINE | Admitting: EMERGENCY MEDICINE
Payer: COMMERCIAL

## 2021-05-08 ENCOUNTER — APPOINTMENT (OUTPATIENT)
Dept: GENERAL RADIOLOGY | Facility: CLINIC | Age: 26
End: 2021-05-08
Attending: EMERGENCY MEDICINE
Payer: COMMERCIAL

## 2021-05-08 VITALS
TEMPERATURE: 99.5 F | SYSTOLIC BLOOD PRESSURE: 110 MMHG | RESPIRATION RATE: 15 BRPM | DIASTOLIC BLOOD PRESSURE: 72 MMHG | HEART RATE: 88 BPM | OXYGEN SATURATION: 98 %

## 2021-05-08 DIAGNOSIS — R06.02 SHORTNESS OF BREATH: ICD-10-CM

## 2021-05-08 DIAGNOSIS — J02.9 PHARYNGITIS, UNSPECIFIED ETIOLOGY: ICD-10-CM

## 2021-05-08 LAB
DEPRECATED S PYO AG THROAT QL EIA: NEGATIVE
LABORATORY COMMENT REPORT: NORMAL
SARS-COV-2 RNA RESP QL NAA+PROBE: NEGATIVE
SPECIMEN SOURCE: NORMAL
STREP GROUP A PCR: NOT DETECTED

## 2021-05-08 PROCEDURE — 87651 STREP A DNA AMP PROBE: CPT | Performed by: EMERGENCY MEDICINE

## 2021-05-08 PROCEDURE — 71045 X-RAY EXAM CHEST 1 VIEW: CPT

## 2021-05-08 PROCEDURE — 99285 EMERGENCY DEPT VISIT HI MDM: CPT | Mod: 25

## 2021-05-08 PROCEDURE — 87635 SARS-COV-2 COVID-19 AMP PRB: CPT | Performed by: EMERGENCY MEDICINE

## 2021-05-08 PROCEDURE — C9803 HOPD COVID-19 SPEC COLLECT: HCPCS

## 2021-05-08 PROCEDURE — 999N001174 HC STATISTIC STREP A RAPID: Performed by: EMERGENCY MEDICINE

## 2021-05-08 PROCEDURE — 93005 ELECTROCARDIOGRAM TRACING: CPT

## 2021-05-08 ASSESSMENT — ENCOUNTER SYMPTOMS
TROUBLE SWALLOWING: 1
SORE THROAT: 1
SHORTNESS OF BREATH: 1
CHILLS: 0
COUGH: 0
FEVER: 0

## 2021-05-08 NOTE — ED PROVIDER NOTES
History   Chief Complaint:  Pharyngitis and Shortness of Breath       HPI   A Amharic phone  was used obtain the below history as well as to explain diagnosis, plan of treatment, reasons to return to the emergency department and appropriate follow up.    Chaim Hallman is a 26 year old male who presents with sore throat and shortness of breath. Approximately three days ago, the patient states that he began experiencing a sore throat that has persisted through today. Last night, he reports the onset of shortness of breath and difficulty swallowing, prompting him to present to the emergency department today. He denies any history of asthma. He denies any fever, chills, chest pain, cough, or any other symptoms. Of note, he reports that his younger brother has been experiencing similar symptoms over the last several days as well.      Review of Systems   Constitutional: Negative for chills and fever.   HENT: Positive for sore throat and trouble swallowing.    Respiratory: Positive for shortness of breath. Negative for cough.    Cardiovascular: Negative for chest pain.   All other systems reviewed and are negative.    Allergies:  The patient has no known allergies.     Medications:  Senokot    Past Medical History:    Condyloma    Past Surgical History:    Scrotal exploration    Social History:  The patient presents alone and lives with his brother.    Physical Exam     Patient Vitals for the past 24 hrs:   BP Temp Temp src Pulse Resp SpO2   05/08/21 1843 110/72 -- -- 88 15 98 %   05/08/21 1631 118/57 99.5  F (37.5  C) Temporal 93 18 98 %       Physical Exam  General/Appearance: appears stated age, well-groomed, appears comfortable  Eyes: EOMI, PERRL, no scleral injection, no icterus  ENT: MMM, OP clear and w/o erythema/edema/exudate, B/L TMs clear  Neck: supple, nl ROM, no stiffness  Cardiovascular: RRR, nl S1S2, no m/r/g, 2+ pulses in all 4 extremities, cap refill <2sec  Respiratory: CTAB, good air movement  throughout, no wheezes/rhonchi/rales, no increased WOB, no retractions  MSK: JHA, good tone, no bony abnormality  Skin: warm and well-perfused, no rash, no edema, no ecchymosis, nl turgor  Neuro: GCS 15, alert and oriented, no gross focal neuro deficits  Psych: interacts appropriately  Heme: no petechia, no purpura, no active bleeding  Lymph: No cervical LAD      Emergency Department Course   ECG  ECG taken at 102, ECG read at 1725  Normal sinus rhythm. Normal ECG.  Rate 80 bpm. FL interval 140 ms. QRS duration 86 ms. QT/QTc 372/429 ms. P-R-T axes 61 70 49.     Imaging:    XR Chest Portable 1 View:   1. The costophrenic angles are not completely included and cannot be   evaluated.   2. No evidence of acute cardiopulmonary disease is seen. Etiology for   patient's shortness of breath is not definitely identified. as per radiology.     Laboratory:     Symptomatic Influenza A/B & SARS-CoV2 (COVID19) Virus PCR Multiplex: Pending     Rapid Strep: Negative  Culture: Pending     Emergency Department Course:    Reviewed:  I reviewed nursing notes, vitals, past medical history and care everywhere.    Assessments:  1637 I obtained history and examined the patient as noted above.     1828 I rechecked the patient and explained findings.     Disposition:  The patient was discharged to home.       Impression & Plan     Medical Decision Making:  This patient is a pleasant 26-year-old male who presents with sore throat and shortness of breath.  Physical exam is unremarkable.  His oral pharyngeal space is without erythema, exudate, edema.  Strep throat swab was unremarkable.  He senses shortness of breath however vitally he stable and on physical exam he has good air movement.  Chest x-ray was obtained which does not show any pneumothorax, pleural effusion, pulmonary edema, infiltrate.  EKG was obtained which is normal sinus rhythm.  I have low suspicion that this represents ACS.  I think it is possible with these multiple system  symptoms that he has Covid so the test has been sent.  I have advised him to quarantine until he gets the results.  Otherwise at this time I think we have looked for and evaluate the life-threatening causes and that he can be safely discharged home.  He feels comfortable with this plan.      Covid-19  Chaim Hallman was evaluated during a global COVID-19 pandemic, which necessitated consideration that the patient might be at risk for infection with the SARS-CoV-2 virus that causes COVID-19.   Applicable protocols for evaluation were followed during the patient's care.   COVID-19 was considered as part of the patient's evaluation. The plan for testing is:  a test was obtained during this visit.    Diagnosis:    ICD-10-CM    1. Pharyngitis, unspecified etiology  J02.9    2. Shortness of breath  R06.02        Scribe Disclosure:  I, Allan Meera, am serving as a scribe at 4:36 PM on 5/8/2021 to document services personally performed by Danna Doshi MD based on my observations and the provider's statements to me.                Danna Doshi MD  05/08/21 1952

## 2021-05-10 LAB — INTERPRETATION ECG - MUSE: NORMAL

## 2022-03-13 ENCOUNTER — HOSPITAL ENCOUNTER (EMERGENCY)
Facility: CLINIC | Age: 27
Discharge: HOME | End: 2022-03-13
Attending: NURSE PRACTITIONER | Admitting: NURSE PRACTITIONER
Payer: MEDICAID

## 2022-03-13 ENCOUNTER — APPOINTMENT (OUTPATIENT)
Dept: ULTRASOUND IMAGING | Facility: CLINIC | Age: 27
End: 2022-03-13
Attending: NURSE PRACTITIONER
Payer: MEDICAID

## 2022-03-13 VITALS
RESPIRATION RATE: 16 BRPM | DIASTOLIC BLOOD PRESSURE: 70 MMHG | HEART RATE: 52 BPM | BODY MASS INDEX: 27 KG/M2 | HEIGHT: 67 IN | SYSTOLIC BLOOD PRESSURE: 113 MMHG | OXYGEN SATURATION: 98 % | WEIGHT: 172 LBS

## 2022-03-13 DIAGNOSIS — R10.13 ABDOMINAL PAIN, EPIGASTRIC: ICD-10-CM

## 2022-03-13 LAB
ALBUMIN SERPL-MCNC: 3.8 G/DL (ref 3.4–5)
ALP SERPL-CCNC: 82 U/L (ref 40–150)
ALT SERPL W P-5'-P-CCNC: 46 U/L (ref 0–70)
ANION GAP SERPL CALCULATED.3IONS-SCNC: 3 MMOL/L (ref 3–14)
AST SERPL W P-5'-P-CCNC: 25 U/L (ref 0–45)
BASOPHILS # BLD AUTO: 0 10E3/UL (ref 0–0.2)
BASOPHILS NFR BLD AUTO: 0 %
BILIRUB SERPL-MCNC: 0.3 MG/DL (ref 0.2–1.3)
BUN SERPL-MCNC: 14 MG/DL (ref 7–30)
CALCIUM SERPL-MCNC: 9 MG/DL (ref 8.5–10.1)
CHLORIDE BLD-SCNC: 106 MMOL/L (ref 94–109)
CO2 SERPL-SCNC: 29 MMOL/L (ref 20–32)
CREAT SERPL-MCNC: 0.86 MG/DL (ref 0.66–1.25)
DEPRECATED S PYO AG THROAT QL EIA: NEGATIVE
EOSINOPHIL # BLD AUTO: 0.4 10E3/UL (ref 0–0.7)
EOSINOPHIL NFR BLD AUTO: 5 %
ERYTHROCYTE [DISTWIDTH] IN BLOOD BY AUTOMATED COUNT: 12 % (ref 10–15)
GFR SERPL CREATININE-BSD FRML MDRD: >90 ML/MIN/1.73M2
GLUCOSE BLD-MCNC: 92 MG/DL (ref 70–99)
GROUP A STREP BY PCR: NOT DETECTED
HCT VFR BLD AUTO: 39.1 % (ref 40–53)
HGB BLD-MCNC: 13.3 G/DL (ref 13.3–17.7)
IMM GRANULOCYTES # BLD: 0 10E3/UL
IMM GRANULOCYTES NFR BLD: 0 %
LIPASE SERPL-CCNC: 93 U/L (ref 73–393)
LYMPHOCYTES # BLD AUTO: 2.5 10E3/UL (ref 0.8–5.3)
LYMPHOCYTES NFR BLD AUTO: 34 %
MCH RBC QN AUTO: 30.1 PG (ref 26.5–33)
MCHC RBC AUTO-ENTMCNC: 34 G/DL (ref 31.5–36.5)
MCV RBC AUTO: 89 FL (ref 78–100)
MONOCYTES # BLD AUTO: 0.6 10E3/UL (ref 0–1.3)
MONOCYTES NFR BLD AUTO: 7 %
NEUTROPHILS # BLD AUTO: 4 10E3/UL (ref 1.6–8.3)
NEUTROPHILS NFR BLD AUTO: 54 %
NRBC # BLD AUTO: 0 10E3/UL
NRBC BLD AUTO-RTO: 0 /100
PLATELET # BLD AUTO: 184 10E3/UL (ref 150–450)
POTASSIUM BLD-SCNC: 4 MMOL/L (ref 3.4–5.3)
PROT SERPL-MCNC: 7.3 G/DL (ref 6.8–8.8)
RBC # BLD AUTO: 4.42 10E6/UL (ref 4.4–5.9)
SODIUM SERPL-SCNC: 138 MMOL/L (ref 133–144)
WBC # BLD AUTO: 7.5 10E3/UL (ref 4–11)

## 2022-03-13 PROCEDURE — 99284 EMERGENCY DEPT VISIT MOD MDM: CPT | Mod: 25

## 2022-03-13 PROCEDURE — 80053 COMPREHEN METABOLIC PANEL: CPT | Performed by: NURSE PRACTITIONER

## 2022-03-13 PROCEDURE — 250N000013 HC RX MED GY IP 250 OP 250 PS 637: Performed by: NURSE PRACTITIONER

## 2022-03-13 PROCEDURE — 250N000009 HC RX 250: Performed by: NURSE PRACTITIONER

## 2022-03-13 PROCEDURE — 76705 ECHO EXAM OF ABDOMEN: CPT

## 2022-03-13 PROCEDURE — 83690 ASSAY OF LIPASE: CPT | Performed by: NURSE PRACTITIONER

## 2022-03-13 PROCEDURE — 96374 THER/PROPH/DIAG INJ IV PUSH: CPT

## 2022-03-13 PROCEDURE — 250N000011 HC RX IP 250 OP 636: Performed by: NURSE PRACTITIONER

## 2022-03-13 PROCEDURE — 36415 COLL VENOUS BLD VENIPUNCTURE: CPT | Performed by: NURSE PRACTITIONER

## 2022-03-13 PROCEDURE — 258N000003 HC RX IP 258 OP 636: Performed by: NURSE PRACTITIONER

## 2022-03-13 PROCEDURE — 87651 STREP A DNA AMP PROBE: CPT | Performed by: NURSE PRACTITIONER

## 2022-03-13 PROCEDURE — 85025 COMPLETE CBC W/AUTO DIFF WBC: CPT | Performed by: NURSE PRACTITIONER

## 2022-03-13 RX ORDER — ONDANSETRON 2 MG/ML
4 INJECTION INTRAMUSCULAR; INTRAVENOUS
Status: COMPLETED | OUTPATIENT
Start: 2022-03-13 | End: 2022-03-13

## 2022-03-13 RX ORDER — SUCRALFATE 1 G/1
1 TABLET ORAL 4 TIMES DAILY
Qty: 40 TABLET | Refills: 0 | Status: SHIPPED | OUTPATIENT
Start: 2022-03-13 | End: 2022-03-23

## 2022-03-13 RX ADMIN — SODIUM CHLORIDE 1000 ML: 9 INJECTION, SOLUTION INTRAVENOUS at 13:18

## 2022-03-13 RX ADMIN — ONDANSETRON 4 MG: 2 INJECTION INTRAMUSCULAR; INTRAVENOUS at 13:27

## 2022-03-13 RX ADMIN — LIDOCAINE HYDROCHLORIDE 30 ML: 20 SOLUTION ORAL; TOPICAL at 13:26

## 2022-03-13 ASSESSMENT — ENCOUNTER SYMPTOMS
SORE THROAT: 1
ABDOMINAL PAIN: 1
DIARRHEA: 0
VOMITING: 1
FEVER: 0
NAUSEA: 1
SHORTNESS OF BREATH: 0
BLOOD IN STOOL: 0

## 2022-03-13 NOTE — ED PROVIDER NOTES
"  History   Chief Complaint:  Abdominal Pain       The history is provided by the patient.      Chaim Hallman is a 26 year old male with a medical history of alcohol abuse who presents with abdominal pain. Yesterday, the patient had an onset of upper abdominal pain and then nausea without vomiting in the evening. His pain worsened with PO intake. He has taken Tylenol at home without significant improvement. Here, he also reports a sore throat that started on 3/10/22. He denies any chest pain, shortness of breath, dysuria, diarrhea, blood in stool, or fevers. He has not had similar past symptoms or prior GI procedures. The patient has had no known sick contacts. He has stopped drinking alcohol but had a relapse of one day on 3/5/22.     Review of Systems   Constitutional: Negative for fever.   HENT: Positive for sore throat.    Respiratory: Negative for shortness of breath.    Cardiovascular: Negative for chest pain.   Gastrointestinal: Positive for abdominal pain (epigastric), nausea and vomiting. Negative for blood in stool and diarrhea.   All other systems reviewed and are negative.    Allergies:  The patient has no known allergies.     Medications:  The patient denies any current medications.     Past Medical History:     Condyloma  Scrotal mass  Alcohol abuse     Past Surgical History:    Scrotal exploration     Social History:  The patient presents to the emergency department with a friend.     Physical Exam     Patient Vitals for the past 24 hrs:   BP Pulse Resp SpO2 Height Weight   03/13/22 1304 -- -- -- -- 1.702 m (5' 7\") 78 kg (172 lb)   03/13/22 1300 124/70 61 16 99 % -- --   03/13/22 1258 124/70 58 -- 99 % -- --       Physical Exam  Nursing notes reviewed. Vitals reviewed.  General: Alert. Well kept.  Eyes:  Conjunctiva non-injected, non-icteric.  Neck/Throat: Moist mucous membranes.  Normal voice.  Cardiac: Regular rhythm. Normal heart sounds with no murmur/rubs/click.   Pulmonary: Clear and equal breath " sounds bilaterally. No crackles/rales. No wheezing  Abdomen: Soft. Non-distended. No masses. No guarding or rebound. Epigastric tenderness without right or left upper quadrant tenderness. No CVA tenderness.  Musculoskeletal: Normal gross range of motion of all 4 extremities.    Neurological: Alert and oriented x4.   Skin: Warm and dry without rashes or petechiae. Normal appearance of visualized exposed skin.  Psych: Affect normal. Good eye contact.    Emergency Department Course     Imaging:  Abdomen US, limited (RUQ only)   Final Result   IMPRESSION:   1.  Unremarkable exam.   2.  Predominantly bowel gas obscured pancreas.                    Report per radiology    Laboratory:  Labs Ordered and Resulted from Time of ED Arrival to Time of ED Departure   CBC WITH PLATELETS AND DIFFERENTIAL - Abnormal       Result Value    WBC Count 7.5      RBC Count 4.42      Hemoglobin 13.3      Hematocrit 39.1 (*)     MCV 89      MCH 30.1      MCHC 34.0      RDW 12.0      Platelet Count 184      % Neutrophils 54      % Lymphocytes 34      % Monocytes 7      % Eosinophils 5      % Basophils 0      % Immature Granulocytes 0      NRBCs per 100 WBC 0      Absolute Neutrophils 4.0      Absolute Lymphocytes 2.5      Absolute Monocytes 0.6      Absolute Eosinophils 0.4      Absolute Basophils 0.0      Absolute Immature Granulocytes 0.0      Absolute NRBCs 0.0     COMPREHENSIVE METABOLIC PANEL - Normal    Sodium 138      Potassium 4.0      Chloride 106      Carbon Dioxide (CO2) 29      Anion Gap 3      Urea Nitrogen 14      Creatinine 0.86      Calcium 9.0      Glucose 92      Alkaline Phosphatase 82      AST 25      ALT 46      Protein Total 7.3      Albumin 3.8      Bilirubin Total 0.3      GFR Estimate >90     LIPASE - Normal    Lipase 93     STREPTOCOCCUS A RAPID SCREEN W REFELX TO PCR - Normal    Group A Strep antigen Negative     GROUP A STREPTOCOCCUS PCR THROAT SWAB      Emergency Department Course:  Reviewed:  I reviewed nursing  notes, vitals, past medical history and Care Everywhere    Assessments:  1259 I obtained history and examined the patient as noted above.   1628 I rechecked the patient and explained findings. He notes is pain is 2/10    Interventions:  1318 0.9% sodium chloride BOLUS 1,000 mL, IV  1326 lidocaine (viscous) (XYLOCAINE) 2 % 15 mL, alum & mag hydroxide-simethicone (MAALOX) 15 mL GI Cocktail, PO   1327 ondansetron (ZOFRAN) injection 4 mg, IV     Disposition:  The patient was discharged to home.     Impression & Plan     Medical Decision Making:  Chaim Hallman is a 26 year old who presents for evaluation of epigastric abdominal pain over the past two days. I considered a broad differential diagnosis for this patient including gastritis, GERD, cholecystitis, choledocolithiasis, biliary colic, pancreatitis, colonic or small bowel pathology, vascular etiologies including aneurysm, ulcer. Signs and symptoms here are consistent with gastritis. No peritoneal signs. Tolerates PO. Patient experienced relief here with GI cocktail and zofran. There are no signs of any serious etiologies as mentioned above or intraabdominal catastrophes. He denies chest pain and has no cardiac history and ACS considered unlikely.  No through to the back pain and history not consistent with dissection.  Abdominal ultrasound was unremarkable. Doubt perforated ulcer at this point based on exam and history. Will refer back to primary and send the patient home on carafate. Consider endoscopy if further symptoms despite this and gastroenterology referral given. Gastritis precautions given for home.    Diagnosis:    ICD-10-CM    1. Abdominal pain, epigastric  R10.13        Discharge Medications:  New Prescriptions    SUCRALFATE (CARAFATE) 1 GM TABLET    Take 1 tablet (1 g) by mouth 4 times daily for 10 days       Scribe Disclosure:  Valarie PRYOR, am serving as a scribe at 12:59 PM on 3/13/2022 to document services personally performed by Constance Keith,  CNP based on my observations and the provider's statements to me.        Constance Keith, CNP  03/13/22 7820